# Patient Record
Sex: MALE | Race: WHITE | NOT HISPANIC OR LATINO | Employment: FULL TIME | URBAN - METROPOLITAN AREA
[De-identification: names, ages, dates, MRNs, and addresses within clinical notes are randomized per-mention and may not be internally consistent; named-entity substitution may affect disease eponyms.]

---

## 2024-09-16 ENCOUNTER — APPOINTMENT (OUTPATIENT)
Dept: LAB | Facility: MEDICAL CENTER | Age: 59
End: 2024-09-16

## 2025-04-11 ENCOUNTER — OFFICE VISIT (OUTPATIENT)
Dept: URGENT CARE | Facility: CLINIC | Age: 60
End: 2025-04-11

## 2025-04-11 VITALS
RESPIRATION RATE: 18 BRPM | BODY MASS INDEX: 25.18 KG/M2 | TEMPERATURE: 99.2 F | OXYGEN SATURATION: 96 % | SYSTOLIC BLOOD PRESSURE: 122 MMHG | HEART RATE: 84 BPM | DIASTOLIC BLOOD PRESSURE: 80 MMHG | WEIGHT: 190 LBS | HEIGHT: 73 IN

## 2025-04-11 DIAGNOSIS — R21 RASH AND NONSPECIFIC SKIN ERUPTION: ICD-10-CM

## 2025-04-11 DIAGNOSIS — J22 LRTI (LOWER RESPIRATORY TRACT INFECTION): ICD-10-CM

## 2025-04-11 PROCEDURE — 3079F DIAST BP 80-89 MM HG: CPT | Performed by: STUDENT IN AN ORGANIZED HEALTH CARE EDUCATION/TRAINING PROGRAM

## 2025-04-11 PROCEDURE — 99203 OFFICE O/P NEW LOW 30 MIN: CPT | Performed by: STUDENT IN AN ORGANIZED HEALTH CARE EDUCATION/TRAINING PROGRAM

## 2025-04-11 PROCEDURE — 3074F SYST BP LT 130 MM HG: CPT | Performed by: STUDENT IN AN ORGANIZED HEALTH CARE EDUCATION/TRAINING PROGRAM

## 2025-04-11 RX ORDER — HYDRALAZINE HYDROCHLORIDE 10 MG/1
10 TABLET, FILM COATED ORAL
COMMUNITY

## 2025-04-11 RX ORDER — AMLODIPINE BESYLATE AND ATORVASTATIN CALCIUM 10; 10 MG/1; MG/1
TABLET, FILM COATED ORAL DAILY
COMMUNITY

## 2025-04-11 RX ORDER — DOXYCYCLINE HYCLATE 100 MG
100 TABLET ORAL 2 TIMES DAILY
Qty: 14 TABLET | Refills: 0 | Status: SHIPPED | OUTPATIENT
Start: 2025-04-11 | End: 2025-04-18

## 2025-04-12 NOTE — PROGRESS NOTES
"Subjective:   Joey Ho is a 60 y.o. male who presents for Cough (Fever, runny nose, headache, rash x1week , worse last day or 2 )      HPI:  60-year-old male presents to the urgent care after developing a cough, sneezing, and postnasal drip for approximately 1 week.  Yesterday started developing body aches, fever, chills, and worsening productive cough.  No chest pain or shortness of breath.  Has also had a rash to the abdomen over the past week as well.      Medications:    amlodipine-atorvastatatin  doxycycline Tabs  hydrALAZINE Tabs  LISINOPRIL PO    Allergies: Patient has no known allergies.    Problem List: Joey Ho does not have a problem list on file.    Surgical History:  No past surgical history on file.    Past Social Hx: Joey Ho  reports that he has never smoked. He has never used smokeless tobacco. He reports that he does not drink alcohol and does not use drugs.     Past Family Hx:  Joey Ho family history is not on file.     Problem list, medications, and allergies reviewed by myself today in Epic.     Objective:     /80   Pulse 84   Temp 37.3 °C (99.2 °F) (Temporal)   Resp 18   Ht 1.854 m (6' 1\")   Wt 86.2 kg (190 lb)   SpO2 96%   BMI 25.07 kg/m²     Physical Exam  Vitals reviewed.   Constitutional:       Appearance: He is not toxic-appearing.   HENT:      Nose: Congestion and rhinorrhea present.      Mouth/Throat:      Pharynx: No posterior oropharyngeal erythema.   Cardiovascular:      Rate and Rhythm: Normal rate and regular rhythm.      Pulses: Normal pulses.      Heart sounds: Normal heart sounds. No murmur heard.  Pulmonary:      Effort: Pulmonary effort is normal. No respiratory distress.      Breath sounds: No stridor. Examination of the right-lower field reveals rhonchi. Rhonchi present. No wheezing or rales.   Skin:     Findings: Rash present.      Comments: Erythematous maculopapular rash to the abdomen and back. "         Assessment/Plan:     Diagnosis and associated orders:     1. LRTI (lower respiratory tract infection)  doxycycline (VIBRAMYCIN) 100 MG Tab      2. Rash and nonspecific skin eruption           Comments/MDM:     Patient's presentation physical exam findings are consistent with initially allergic rhinitis versus a viral upper respiratory tract infection.  His rash did develop around the initial beginning of his symptoms a week ago and is consistent with a viral exanthem.  He is having a worsening productive cough and new onset fever, chills, and bodyaches.  He does have some rhonchi to the right lower lung field concerning for lower respiratory tract infection secondary to his initial symptoms.  Unfortunately do not have x-ray available in the clinic today since 4:30 PM and the patient arrived after this.  Unable to further evaluate for pneumonia on imaging.  Patient is also self-pay so we will avoid imaging at this time.  Discussed potential for him initially having allergy symptoms now followed by a new virus such as influenza.  Again the viral testing can be very costly so we will avoid this as well.  Will treat the patient for suspected lower respiratory tract infection which she is agreeable to.  Doxycycline prescribed.         Differential diagnosis, natural history, supportive care, and indications for immediate follow-up discussed.    Advised the patient to follow-up with the primary care physician for recheck, reevaluation, and consideration of further management.    Please note that this dictation was created using voice recognition software. I have made a reasonable attempt to correct obvious errors, but I expect that there are errors of grammar and possibly content that I did not discover before finalizing the note.    Electronically signed by Fitz Aguilar PA-C.